# Patient Record
Sex: FEMALE | URBAN - METROPOLITAN AREA
[De-identification: names, ages, dates, MRNs, and addresses within clinical notes are randomized per-mention and may not be internally consistent; named-entity substitution may affect disease eponyms.]

---

## 2019-10-01 ENCOUNTER — APPOINTMENT (RX ONLY)
Dept: URBAN - METROPOLITAN AREA CLINIC 152 | Facility: CLINIC | Age: 39
Setting detail: DERMATOLOGY
End: 2019-10-01

## 2019-10-01 DIAGNOSIS — Z41.9 ENCOUNTER FOR PROCEDURE FOR PURPOSES OTHER THAN REMEDYING HEALTH STATE, UNSPECIFIED: ICD-10-CM

## 2019-10-01 PROCEDURE — ? BOTOX

## 2019-10-01 PROCEDURE — ? JUVEDERM ULTRA XC INJECTION

## 2019-10-01 PROCEDURE — ? MEDICAL CONSULTATION: DYNAMIC RHYTIDES

## 2019-10-01 PROCEDURE — ? MEDICAL CONSULTATION: FILLERS

## 2019-10-01 ASSESSMENT — LOCATION SIMPLE DESCRIPTION DERM
LOCATION SIMPLE: LEFT CHEEK
LOCATION SIMPLE: RIGHT FOREHEAD
LOCATION SIMPLE: INFERIOR FOREHEAD
LOCATION SIMPLE: LEFT FOREHEAD
LOCATION SIMPLE: RIGHT CHEEK

## 2019-10-01 ASSESSMENT — LOCATION DETAILED DESCRIPTION DERM
LOCATION DETAILED: RIGHT LATERAL FOREHEAD
LOCATION DETAILED: LEFT SUPERIOR MEDIAL MALAR CHEEK
LOCATION DETAILED: RIGHT SUPERIOR FOREHEAD
LOCATION DETAILED: LEFT FOREHEAD
LOCATION DETAILED: RIGHT FOREHEAD
LOCATION DETAILED: LEFT LATERAL FOREHEAD
LOCATION DETAILED: RIGHT SUPERIOR CENTRAL MALAR CHEEK
LOCATION DETAILED: RIGHT SUPERIOR MEDIAL MALAR CHEEK
LOCATION DETAILED: LEFT SUPERIOR CENTRAL MALAR CHEEK
LOCATION DETAILED: INFERIOR MID FOREHEAD
LOCATION DETAILED: LEFT SUPERIOR FOREHEAD

## 2019-10-01 ASSESSMENT — LOCATION ZONE DERM: LOCATION ZONE: FACE

## 2019-10-01 NOTE — PROCEDURE: JUVEDERM ULTRA XC INJECTION
Additional Area 2 Volume In Cc: 0
Expiration Date (Month Year): 7/28/2020
Topical Anesthesia?: 15% lidocaine, 5% prilocaine, 0.25% phenenylephrine
Tear Troughs Filler  Volume In Cc: 0.3
Use Map Statement For Sites (Optional): No
Consent: Written consent obtained. Risks include but not limited to bruising, beading, irregular texture, ulceration, infection, allergic reaction, scar formation, incomplete augmentation, temporary nature, procedural pain.
Map Statment: See Attach Map for Complete Details
Detail Level: Detailed
Filler: Juvederm Ultra XC
Lot #: [id number]
Procedural Text: The filler was administered to the treatment areas noted above.
Post-Care Instructions: Patient instructed to apply ice to reduce swelling.
Price (Use Numbers Only, No Special Characters Or $): 335

## 2019-10-01 NOTE — PROCEDURE: BOTOX
Masseter Units: 0
Show Additional Area 4: Yes
Expiration Date (Month Year): 1/2022
Topical Anesthesia?: 15% lidocaine, 5% prilocaine, 0.25% phenenylephrine
Forehead Units: 12
Show Ucl Units: No
Price (Use Numbers Only, No Special Characters Or $): 192
Dilution (U/0.1 Cc): 4
Detail Level: Detailed
Length Of Topical Anesthesia Application (Optional): 20 minutes
Consent: Written consent obtained. Risks include but not limited to lid/brow ptosis, bruising, swelling, diplopia, temporary effect, incomplete chemical denervation.
Lot #: [id number]
Post-Care Instructions: Patient instructed to not lie down for 4 hours and limit physical activity for 24 hours. Patient instructed not to travel by airplane for 48 hours.

## 2019-10-01 NOTE — HPI: COSMETIC (FILLERS)
Have You Had Fillers Before?: has not had fillers
Additional History: Pt wanted to consult. Has never had cosmetic fillers or Botox. Is most interested in the areas under her eyes.

## 2019-10-08 ENCOUNTER — APPOINTMENT (RX ONLY)
Dept: URBAN - METROPOLITAN AREA CLINIC 152 | Facility: CLINIC | Age: 39
Setting detail: DERMATOLOGY
End: 2019-10-08

## 2019-10-08 DIAGNOSIS — Z41.9 ENCOUNTER FOR PROCEDURE FOR PURPOSES OTHER THAN REMEDYING HEALTH STATE, UNSPECIFIED: ICD-10-CM

## 2019-10-08 PROCEDURE — ? BOTOX

## 2019-10-08 ASSESSMENT — LOCATION DETAILED DESCRIPTION DERM
LOCATION DETAILED: RIGHT INFERIOR MEDIAL FOREHEAD
LOCATION DETAILED: RIGHT LATERAL EYEBROW
LOCATION DETAILED: LEFT LATERAL EYEBROW
LOCATION DETAILED: GLABELLA
LOCATION DETAILED: RIGHT SUPERIOR LATERAL MALAR CHEEK
LOCATION DETAILED: LEFT SUPERIOR LATERAL MALAR CHEEK
LOCATION DETAILED: LEFT INFERIOR TEMPLE
LOCATION DETAILED: LEFT INFERIOR MEDIAL FOREHEAD
LOCATION DETAILED: RIGHT INFERIOR TEMPLE

## 2019-10-08 ASSESSMENT — LOCATION SIMPLE DESCRIPTION DERM
LOCATION SIMPLE: RIGHT EYEBROW
LOCATION SIMPLE: LEFT EYEBROW
LOCATION SIMPLE: RIGHT CHEEK
LOCATION SIMPLE: GLABELLA
LOCATION SIMPLE: LEFT CHEEK
LOCATION SIMPLE: LEFT FOREHEAD
LOCATION SIMPLE: RIGHT TEMPLE
LOCATION SIMPLE: LEFT TEMPLE
LOCATION SIMPLE: RIGHT FOREHEAD

## 2019-10-08 ASSESSMENT — LOCATION ZONE DERM: LOCATION ZONE: FACE

## 2019-10-08 NOTE — PROCEDURE: BOTOX
Right Periorbital Units: 0
Post-Care Instructions: Patient instructed to not lie down for 4 hours and limit physical activity for 24 hours. Patient instructed not to travel by airplane for 48 hours.
Show Depressor Anguli Units: Yes
Show Right And Left Periorbital Units: No
Dilution (U/0.1 Cc): 4
Detail Level: Detailed
Expiration Date (Month Year): 01/2022
Glabellar Complex Units: 12
Lot #: [id number]
Consent: Written consent obtained. Risks include but not limited to lid/brow ptosis, bruising, swelling, diplopia, temporary effect, incomplete chemical denervation.
Price (Use Numbers Only, No Special Characters Or $): 384

## 2019-12-04 ENCOUNTER — RX ONLY (OUTPATIENT)
Age: 39
Setting detail: RX ONLY
End: 2019-12-04

## 2019-12-04 ENCOUNTER — APPOINTMENT (RX ONLY)
Dept: URBAN - METROPOLITAN AREA CLINIC 152 | Facility: CLINIC | Age: 39
Setting detail: DERMATOLOGY
End: 2019-12-04

## 2019-12-04 DIAGNOSIS — L81.4 OTHER MELANIN HYPERPIGMENTATION: ICD-10-CM

## 2019-12-04 PROCEDURE — ? DIAGNOSIS COMMENT

## 2019-12-04 PROCEDURE — ? COUNSELING

## 2019-12-04 PROCEDURE — ? PRESCRIPTION

## 2019-12-04 PROCEDURE — 99213 OFFICE O/P EST LOW 20 MIN: CPT

## 2019-12-04 RX ORDER — HYDROQUINONE 40 MG/G
CREAM TOPICAL QD
Qty: 1 | Refills: 0 | Status: ACTIVE

## 2019-12-04 RX ORDER — HYDROQUINONE 40 MG/G
CREAM TOPICAL QD
Qty: 1 | Refills: 1 | Status: ERX | COMMUNITY
Start: 2019-12-04

## 2019-12-04 ASSESSMENT — LOCATION SIMPLE DESCRIPTION DERM
LOCATION SIMPLE: RIGHT CHEEK
LOCATION SIMPLE: LEFT CHEEK

## 2019-12-04 ASSESSMENT — LOCATION DETAILED DESCRIPTION DERM
LOCATION DETAILED: LEFT SUPERIOR MEDIAL MALAR CHEEK
LOCATION DETAILED: RIGHT SUPERIOR MEDIAL MALAR CHEEK

## 2019-12-04 ASSESSMENT — LOCATION ZONE DERM: LOCATION ZONE: FACE

## 2019-12-04 NOTE — PROCEDURE: DIAGNOSIS COMMENT
Comment: After evaluation of pre-treatment photos, it is apparent that the hyperpigmentation was present prior to the treatment. The correction of the tear trough volume loss has simply made the hyperpigmentation more apparent, however it has not increased or been caused as a result of treatment. Upon viewing the side by side pre and post photos, patient understands and agrees.  I recommend a trial of Hydroquinone 4% cream mixed with lumiere eye cream bid. I explained the HQ can cause some irritation so she should begin once daily then increase to twice daily after several weeks. Results are slow to appear. Rtc in 6 weeks for follow up; unlikely to notice any change prior to that. Also recommended colorescience mineral palette corrector to conceal hyperpigmentation in the meantime.\\n\\nPatient asked about additional filler to the eye area today however I do not recommend any additional treatment of that area until the hyperpigmentation issue is addressed.
Detail Level: Simple